# Patient Record
Sex: FEMALE | Race: BLACK OR AFRICAN AMERICAN | NOT HISPANIC OR LATINO | Employment: FULL TIME | URBAN - METROPOLITAN AREA
[De-identification: names, ages, dates, MRNs, and addresses within clinical notes are randomized per-mention and may not be internally consistent; named-entity substitution may affect disease eponyms.]

---

## 2019-02-16 ENCOUNTER — OFFICE VISIT (OUTPATIENT)
Dept: URGENT CARE | Facility: CLINIC | Age: 34
End: 2019-02-16
Payer: COMMERCIAL

## 2019-02-16 VITALS
SYSTOLIC BLOOD PRESSURE: 123 MMHG | OXYGEN SATURATION: 100 % | HEIGHT: 65 IN | TEMPERATURE: 98 F | WEIGHT: 155 LBS | DIASTOLIC BLOOD PRESSURE: 87 MMHG | BODY MASS INDEX: 25.83 KG/M2 | RESPIRATION RATE: 18 BRPM | HEART RATE: 77 BPM

## 2019-02-16 DIAGNOSIS — L03.115 CELLULITIS OF RIGHT THIGH: ICD-10-CM

## 2019-02-16 DIAGNOSIS — L03.116 CELLULITIS OF LEFT THIGH: Primary | ICD-10-CM

## 2019-02-16 DIAGNOSIS — I80.02 THROMBOPHLEBITIS OF SUPERFICIAL VEINS OF LEFT LOWER EXTREMITY: ICD-10-CM

## 2019-02-16 PROCEDURE — 99203 OFFICE O/P NEW LOW 30 MIN: CPT | Mod: S$GLB,,, | Performed by: EMERGENCY MEDICINE

## 2019-02-16 PROCEDURE — 3008F BODY MASS INDEX DOCD: CPT | Mod: CPTII,S$GLB,, | Performed by: EMERGENCY MEDICINE

## 2019-02-16 PROCEDURE — 99203 PR OFFICE/OUTPT VISIT, NEW, LEVL III, 30-44 MIN: ICD-10-PCS | Mod: S$GLB,,, | Performed by: EMERGENCY MEDICINE

## 2019-02-16 PROCEDURE — 3008F PR BODY MASS INDEX (BMI) DOCUMENTED: ICD-10-PCS | Mod: CPTII,S$GLB,, | Performed by: EMERGENCY MEDICINE

## 2019-02-16 RX ORDER — MUPIROCIN 20 MG/G
OINTMENT TOPICAL
Qty: 22 G | Refills: 1 | Status: SHIPPED | OUTPATIENT
Start: 2019-02-16

## 2019-02-16 RX ORDER — IBUPROFEN 600 MG/1
600 TABLET ORAL EVERY 6 HOURS PRN
Qty: 20 TABLET | Refills: 0 | Status: SHIPPED | OUTPATIENT
Start: 2019-02-16 | End: 2019-02-21

## 2019-02-16 RX ORDER — SULFAMETHOXAZOLE AND TRIMETHOPRIM 800; 160 MG/1; MG/1
1 TABLET ORAL 2 TIMES DAILY
Qty: 20 TABLET | Refills: 0 | Status: SHIPPED | OUTPATIENT
Start: 2019-02-16 | End: 2019-02-26

## 2019-02-16 NOTE — PROGRESS NOTES
"Subjective:       Patient ID: Indy Parr is a 33 y.o. female.    Vitals:  height is 5' 5" (1.651 m) and weight is 70.3 kg (155 lb). Her temperature is 97.5 °F (36.4 °C). Her blood pressure is 123/87 and her pulse is 77. Her respiration is 18 and oxygen saturation is 100%.     Chief Complaint: Rash (hard lump on allie thighs, itchy x 3 days )    Patient denies having any sort of bite or abrasion or skin disruption except for wearing pants and work close may have been rubbing together.  The location of the redness and induration on the left inner thigh is at the same distance as the redness on the right inner thigh.  The left is worse than right.  It left has induration, redness, tenderness to palpation in the right has redness and very mild tenderness.  She wanted to make sure that she did not have an abscess or any infection that needed to have drained.  It does not itch, it is tender.  There are no vesicles.  There is no history of ingrown hairs at that site.  She has taken nothing for.      Rash   This is a new problem. The current episode started in the past 7 days. The problem is unchanged. The affected locations include the left upper leg and right upper leg. The rash is characterized by itchiness. It is unknown if there was an exposure to a precipitant. Pertinent negatives include no cough, fever or sore throat. Past treatments include nothing.       Constitution: Negative for chills and fever.   HENT: Negative for facial swelling and sore throat.    Neck: Negative for painful lymph nodes.   Eyes: Negative for eye itching and eyelid swelling.   Respiratory: Negative for cough.    Musculoskeletal: Negative for joint pain and joint swelling.   Skin: Positive for rash, erythema and abscess. Negative for color change, pale, wound, abrasion, laceration, lesion, skin thickening/induration, puncture wound, bruising, avulsion and hives.   Allergic/Immunologic: Negative for environmental allergies, " immunocompromised state and hives.   Hematologic/Lymphatic: Negative for swollen lymph nodes.       Objective:      Physical Exam   Constitutional: She is oriented to person, place, and time. She appears well-developed and well-nourished.   HENT:   Head: Normocephalic and atraumatic. Head is without abrasion, without contusion and without laceration.   Right Ear: External ear normal.   Left Ear: External ear normal.   Nose: Nose normal.   Mouth/Throat: Oropharynx is clear and moist.   Eyes: Conjunctivae, EOM and lids are normal. Pupils are equal, round, and reactive to light.   Neck: Trachea normal, normal range of motion, full passive range of motion without pain and phonation normal. Neck supple.   Cardiovascular: Normal rate, regular rhythm and normal heart sounds.   Pulmonary/Chest: Effort normal and breath sounds normal. No stridor. No respiratory distress.   Musculoskeletal: Normal range of motion.   Lymphadenopathy:     She has no cervical adenopathy.   Neurological: She is alert and oriented to person, place, and time.   Skin: Skin is warm, dry and intact. Capillary refill takes less than 2 seconds. No abrasion, no bruising, no burn, no ecchymosis, no laceration, no lesion and no rash noted. There is erythema.   Left inner thigh there is a 4 cm x 2 cm area of erythema and induration.  There is a hint of extremely superficial thrombophlebitis surrounding the area.  Nothing deep, and there certainly is no abscess or fluctuant area.  Consistent with superficial thrombophlebitis and cellulitis.  On the right side there is a 1 x 1 cm area of erythema and tenderness to palpation without vesicles or excoriations and consistent with very superficial cellulitis.  No evidence of thrombophlebitis on the right side.   Psychiatric: She has a normal mood and affect. Her speech is normal and behavior is normal. Cognition and memory are normal.   Reserved and flat affect   Nursing note and vitals reviewed.      Assessment:        1. Cellulitis of left thigh    2. Cellulitis of right thigh    3. Thrombophlebitis of superficial veins of left lower extremity        Plan:         Cellulitis of left thigh  Comments:  superficial    Cellulitis of right thigh  Comments:  very superficial    Thrombophlebitis of superficial veins of left lower extremity    Other orders  -     ibuprofen (ADVIL,MOTRIN) 600 MG tablet; Take 1 tablet (600 mg total) by mouth every 6 (six) hours as needed for Pain or Temperature greater than (100.4).  Dispense: 20 tablet; Refill: 0  -     sulfamethoxazole-trimethoprim 800-160mg (BACTRIM DS) 800-160 mg Tab; Take 1 tablet by mouth 2 (two) times daily. for 10 days  Dispense: 20 tablet; Refill: 0  -     mupirocin (BACTROBAN) 2 % ointment; Apply to affected area 3 times daily  Dispense: 22 g; Refill: 1          Patient Instructions     Ice the sore areas  Wear looser pants during work if possible  Bactroban ointment prescription  Bactrim double strength prescription  As there is a hint of inflamed superficial veins like superficial thrombophlebitis like we have discussed, take the NSAID or anti-inflammatory ibuprofen 600 mg every 6 hr for 4-5 days and this will help with pain as well as the inflammation of the vein and potentially.  This is not a blood clot or DVT of the leg.  Return sooner if worse in any way or if actual abscess or pus pocket develops as we have discussed.  There is nothing to drain at this time.  Go to the ER for worsening of current condition and if we are closed.      Discharge Instructions for Cellulitis  You have been diagnosed with cellulitis. This is an infection in the deepest layer of the skin. In some cases, the infection also affects the muscle. Cellulitis is caused by bacteria. The bacteria can enter the body through broken skin. This can happen with a cut, scratch, animal bite, or an insect bite that has been scratched. You may have been treated in the hospital with antibiotics and fluids.  You will likely be given a prescription for antibiotics to take at home. This sheet will help you take care of yourself at home.  Home care  When you are home:  · Take the prescribed antibiotic medicine you are given as directed until it is gone. Take it even if you feel better. It treats the infection and stops it from returning. Not taking all the medicine can make future infections hard to treat.  · Keep the infected area clean.  · When possible, raise the infected area above the level of your heart. This helps keep swelling down.  · Talk with your healthcare provider if you are in pain. Ask what kind of over-the-counter medicine you can take for pain.  · Apply clean bandages as advised.  · Take your temperature once a day for a week.  · Wash your hands often to prevent spreading the infection.  In the future, wash your hands before and after you touch cuts, scratches, or bandages. This will help prevent infection.   When to call your healthcare provider  Call your healthcare provider immediately if you have any of the following:  · Difficulty or pain when moving the joints above or below the infected area  · Discharge or pus draining from the area  · Fever of 100.4°F (38°C) or higher, or as directed by your healthcare provider  · Pain that gets worse in or around the infected   · Redness that gets worse in or around the infected area, particularly if the area of redness expands to a wider area  · Shaking chills  · Swelling of the infected area  · Vomiting   Date Last Reviewed: 8/1/2016  © 9220-7878 [employer]. [address] [place] [address]. All rights reserved. This information is not intended as a substitute for professional medical care. Always follow your healthcare professional's instructions.        Superficial Thrombophlebitis   The superficial veins are the veins near the surface of the skin. Superficial thrombophlebitis is a problem that occurs when one or more of these veins  become inflamed (red, irritated, and swollen). This is most often because of a blood clot.  Causes  The problem may occur after injury to a vein. It may also occur after having an intravenous (IV) line placed. Other factors that can make the problem more likely include:  · Varicose veins  · Venous insufficiency  · Bleeding disorders  · Prolonged periods of rest and not moving around  · IV drug abuse  Symptoms  Symptoms may appear in the affected area. They can include:  · Pain  · Tenderness  · Redness  · Warmth  · Swelling  · Hardening of the vein  In most cases, superficial thrombophlebitis resolves on its own with no problems. Treatment is focused on relieving symptoms.  Sometimes, there is a risk that the deep veins in the body may also be involved. This can lead to more serious problems. In such cases, further testing and treatments may be needed. Your healthcare provider can tell you more about this.  Home Care  To help relieve pain and swelling, you may be told to:  · Apply heat or cold to the affected area. Do this for up to 10 minutes as often as directed.  ¨ Heat: Use a warm compress, such as a heating pad.  ¨ Cold: Use a cold compress, such as a cold pack or bag of ice wrapped in a thin towel.  · Take nonsteroidal anti-inflammatory drugs (NSAIDS), such as ibuprofen. In some cases, other pain medicines may be prescribed.  · Keep the affected limb (arm or leg) raised above heart level as directed.  · Wear elastic compression stockings or bandages as directed.  · Avoid prolonged sitting or standing. Get up and walk often.  To help treat a blood clot, a blood thinner (anticoagulant) may be prescribed. If this is needed, be sure to take the medicine exactly as directed.  Follow-up care  Follow up with your healthcare provider as advised. If imaging tests are done, they will be reviewed by a doctor. Youll be told the results and any new findings that may affect your treatment.  When to seek medical advice  Call  your healthcare provider right away if any of these occur:  · Fever of 100.4°F (38ºC) or higher, or as directed by your provider  · Increasing pain, swelling, or tenderness in the affected area  · Spreading warmth or redness in the affected area  Call 911  Call 911 right away if any of these occur:  · Trouble breathing  · Chest pain or discomfort that worsens with deep breathing or coughing  · Coughing (may cough up blood)  · Fast or irregular heartbeat  · Sweating  · Anxiety  · Lightheadedness, dizziness, or fainting  · Extreme confusion  · Extreme drowsiness or trouble waking up  · New pain in the chest, arm, shoulder, neck, or upper back  Date Last Reviewed: 9/21/2015 © 2000-2017 [employer]. [address] [place] [address], [place] [address]. All rights reserved. This information is not intended as a substitute for professional medical care. Always follow your healthcare professional's instructions.

## 2019-02-16 NOTE — PATIENT INSTRUCTIONS
Ice the sore areas  Wear looser pants during work if possible  Bactroban ointment prescription  Bactrim double strength prescription  As there is a hint of inflamed superficial veins like superficial thrombophlebitis like we have discussed, take the NSAID or anti-inflammatory ibuprofen 600 mg every 6 hr for 4-5 days and this will help with pain as well as the inflammation of the vein and potentially.  This is not a blood clot or DVT of the leg.  Return sooner if worse in any way or if actual abscess or pus pocket develops as we have discussed.  There is nothing to drain at this time.  Go to the ER for worsening of current condition and if we are closed.      Discharge Instructions for Cellulitis  You have been diagnosed with cellulitis. This is an infection in the deepest layer of the skin. In some cases, the infection also affects the muscle. Cellulitis is caused by bacteria. The bacteria can enter the body through broken skin. This can happen with a cut, scratch, animal bite, or an insect bite that has been scratched. You may have been treated in the hospital with antibiotics and fluids. You will likely be given a prescription for antibiotics to take at home. This sheet will help you take care of yourself at home.  Home care  When you are home:  · Take the prescribed antibiotic medicine you are given as directed until it is gone. Take it even if you feel better. It treats the infection and stops it from returning. Not taking all the medicine can make future infections hard to treat.  · Keep the infected area clean.  · When possible, raise the infected area above the level of your heart. This helps keep swelling down.  · Talk with your healthcare provider if you are in pain. Ask what kind of over-the-counter medicine you can take for pain.  · Apply clean bandages as advised.  · Take your temperature once a day for a week.  · Wash your hands often to prevent spreading the infection.  In the future, wash your hands  before and after you touch cuts, scratches, or bandages. This will help prevent infection.   When to call your healthcare provider  Call your healthcare provider immediately if you have any of the following:  · Difficulty or pain when moving the joints above or below the infected area  · Discharge or pus draining from the area  · Fever of 100.4°F (38°C) or higher, or as directed by your healthcare provider  · Pain that gets worse in or around the infected   · Redness that gets worse in or around the infected area, particularly if the area of redness expands to a wider area  · Shaking chills  · Swelling of the infected area  · Vomiting   Date Last Reviewed: 8/1/2016  © 2419-7001 [employer]. [address] [place] [address], [place] [address]. All rights reserved. This information is not intended as a substitute for professional medical care. Always follow your healthcare professional's instructions.        Superficial Thrombophlebitis   The superficial veins are the veins near the surface of the skin. Superficial thrombophlebitis is a problem that occurs when one or more of these veins become inflamed (red, irritated, and swollen). This is most often because of a blood clot.  Causes  The problem may occur after injury to a vein. It may also occur after having an intravenous (IV) line placed. Other factors that can make the problem more likely include:  · Varicose veins  · Venous insufficiency  · Bleeding disorders  · Prolonged periods of rest and not moving around  · IV drug abuse  Symptoms  Symptoms may appear in the affected area. They can include:  · Pain  · Tenderness  · Redness  · Warmth  · Swelling  · Hardening of the vein  In most cases, superficial thrombophlebitis resolves on its own with no problems. Treatment is focused on relieving symptoms.  Sometimes, there is a risk that the deep veins in the body may also be involved. This can lead to more serious problems. In such cases, further testing and  treatments may be needed. Your healthcare provider can tell you more about this.  Home Care  To help relieve pain and swelling, you may be told to:  · Apply heat or cold to the affected area. Do this for up to 10 minutes as often as directed.  ¨ Heat: Use a warm compress, such as a heating pad.  ¨ Cold: Use a cold compress, such as a cold pack or bag of ice wrapped in a thin towel.  · Take nonsteroidal anti-inflammatory drugs (NSAIDS), such as ibuprofen. In some cases, other pain medicines may be prescribed.  · Keep the affected limb (arm or leg) raised above heart level as directed.  · Wear elastic compression stockings or bandages as directed.  · Avoid prolonged sitting or standing. Get up and walk often.  To help treat a blood clot, a blood thinner (anticoagulant) may be prescribed. If this is needed, be sure to take the medicine exactly as directed.  Follow-up care  Follow up with your healthcare provider as advised. If imaging tests are done, they will be reviewed by a doctor. Youll be told the results and any new findings that may affect your treatment.  When to seek medical advice  Call your healthcare provider right away if any of these occur:  · Fever of 100.4°F (38ºC) or higher, or as directed by your provider  · Increasing pain, swelling, or tenderness in the affected area  · Spreading warmth or redness in the affected area  Call 911  Call 911 right away if any of these occur:  · Trouble breathing  · Chest pain or discomfort that worsens with deep breathing or coughing  · Coughing (may cough up blood)  · Fast or irregular heartbeat  · Sweating  · Anxiety  · Lightheadedness, dizziness, or fainting  · Extreme confusion  · Extreme drowsiness or trouble waking up  · New pain in the chest, arm, shoulder, neck, or upper back  Date Last Reviewed: 9/21/2015  © 5927-5193 [employer]. [address] [place] [address], [place] [address]. All rights reserved. This information is not intended as a substitute for  professional medical care. Always follow your healthcare professional's instructions.

## 2019-03-19 ENCOUNTER — OFFICE VISIT (OUTPATIENT)
Dept: URGENT CARE | Facility: CLINIC | Age: 34
End: 2019-03-19
Payer: COMMERCIAL

## 2019-03-19 VITALS
DIASTOLIC BLOOD PRESSURE: 68 MMHG | HEART RATE: 80 BPM | SYSTOLIC BLOOD PRESSURE: 107 MMHG | BODY MASS INDEX: 23.82 KG/M2 | RESPIRATION RATE: 18 BRPM | HEIGHT: 65 IN | OXYGEN SATURATION: 100 % | WEIGHT: 143 LBS | TEMPERATURE: 98 F

## 2019-03-19 DIAGNOSIS — M25.511 ACUTE PAIN OF RIGHT SHOULDER: Primary | ICD-10-CM

## 2019-03-19 PROCEDURE — 3008F PR BODY MASS INDEX (BMI) DOCUMENTED: ICD-10-PCS | Mod: CPTII,S$GLB,, | Performed by: PHYSICIAN ASSISTANT

## 2019-03-19 PROCEDURE — 99214 PR OFFICE/OUTPT VISIT, EST, LEVL IV, 30-39 MIN: ICD-10-PCS | Mod: S$GLB,,, | Performed by: PHYSICIAN ASSISTANT

## 2019-03-19 PROCEDURE — 73030 XR SHOULDER COMPLETE 2 OR MORE VIEWS RIGHT: ICD-10-PCS | Mod: RT,S$GLB,, | Performed by: RADIOLOGY

## 2019-03-19 PROCEDURE — 73030 X-RAY EXAM OF SHOULDER: CPT | Mod: RT,S$GLB,, | Performed by: RADIOLOGY

## 2019-03-19 PROCEDURE — 99214 OFFICE O/P EST MOD 30 MIN: CPT | Mod: S$GLB,,, | Performed by: PHYSICIAN ASSISTANT

## 2019-03-19 PROCEDURE — 3008F BODY MASS INDEX DOCD: CPT | Mod: CPTII,S$GLB,, | Performed by: PHYSICIAN ASSISTANT

## 2019-03-19 NOTE — PATIENT INSTRUCTIONS
General Discharge Instructions   If you were prescribed a narcotic or controlled medication, do not drive or operate heavy equipment or machinery while taking these medications.  If you were prescribed antibiotics, please take them to completion.  You must understand that you've received an Urgent Care treatment only and that you may be released before all your medical problems are known or treated. You, the patient, will arrange for follow up care as instructed.  Follow up with your PCP or specialty clinic as directed in the next 1-2 weeks if not improved or as needed.  You can call [phone] to schedule an appointment with the appropriate provider.  If your condition worsens we recommend that you receive another evaluation at the emergency room immediately or contact your primary medical clinics after hours call service to discuss your concerns.  Please return here or go to the Emergency Department for any concerns or worsening of condition.      Shoulder Pain with Uncertain Cause  Shoulder pain can have many causes. Pain often comes from the structures that surround the shoulder joint. These are the joint capsule, ligaments, tendons, muscles, and bursa. Pain can also come from cartilage in the joint. Cartilage can become worn out or injured. Its important to know whats causing your pain so the healthcare provider can use the correct treatment. But sometimes its difficult to find the exact cause of shoulder pain. You may need to see a specialist (orthopedist). You may also need special tests such as a CT scan or MRI. The provider may need to use special tools to look inside the joint (arthroscopy).  Shoulder pain can be treated with a sling or a device that keeps your shoulder from moving. You can take an anti-inflammatory medicine such as ibuprofen to ease pain. You may need to do special shoulder exercises. Follow up with a specialist if the pain is severe or doesnt go away after a few weeks.  Home  care  Follow these tips when caring for yourself at home:  · If a sling was given to you, leave it in place for the time advised by your healthcare provider. If you arent sure how long to wear it, ask for advice. If the sling becomes loose, adjust it so that your forearm is level with the ground. Your shoulder should feel well supported.  · Put an ice pack on the injured area for 20 minutes every 1 to 2 hours the first day. You can make your own ice pack by putting ice cubes in a plastic bag. Wrap the bag in a thin towel. Continue with ice packs 3 to 4 times a day for the next 2 days. Then use the pack as needed to ease pain and swelling.  · You may use acetaminophen or ibuprofen to control pain, unless another pain medicine was prescribed. If you have chronic liver or kidney disease, talk with your healthcare provider before using these medicines. Also talk with your provider if youve ever had a stomach ulcer or GI bleeding.  · Shoulder pain may seem worse at night, when there is less to distract you from the pain. If you sleep on your side, try to keep weight off your painful shoulder. Propping pillows behind you may stop you from rolling over onto that shoulder during sleep.   · Shoulder and elbow joints can become stiff if left in a sling for too long. You should start range of motion exercises about 7 to 10 days after the injury. Talk with your provider to find out what type of exercises to do and how soon to start.  · You can take the sling off to shower or bathe.  Follow-up care  Follow up with your healthcare provider if you dont start to get better in the next 5 days.  When to seek medical advice  Call your healthcare provider right away if any of these occur:  · Pain or swelling gets worse or continues for more than a few days  · Your hand or fingers become cold, blue, numb, or tingly  · Large amount of bruising on your shoulder or upper arm  · Difficulty moving your hand or fingers  · Weakness in your  hand or fingers  · Your shoulder becomes stiff  · It feels like your shoulder is popping out  · You are less able to do your daily activities  Date Last Reviewed: 10/1/2016  © 5336-8808 [employer]. [address] [place] [address], [place] [address]. All rights reserved. This information is not intended as a substitute for professional medical care. Always follow your healthcare professional's instructions.
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
## 2021-04-16 ENCOUNTER — PATIENT MESSAGE (OUTPATIENT)
Dept: RESEARCH | Facility: HOSPITAL | Age: 36
End: 2021-04-16